# Patient Record
(demographics unavailable — no encounter records)

---

## 2025-06-13 NOTE — PLAN
[FreeTextEntry1] : Reviewed CBE and Pap intervals with patient.  Pap and sti labs collected without complication. pap tolerated well.  Discussed pts fertility. Reviewed when to have sex and how to track ovulation.  Discussed that semen analysis may be necessary for partner.  Referral for hsg and LISA provided today.  Pt expressed understanding.  Questions answered to stated satisfaction.

## 2025-06-13 NOTE — PHYSICAL EXAM
[Chaperoned Physical Exam] : A chaperone was present in the examining room during all aspects of the physical examination. [MA] : MA [Appropriately responsive] : appropriately responsive [Alert] : alert [No Acute Distress] : no acute distress [No Lymphadenopathy] : no lymphadenopathy [Soft] : soft [Non-tender] : non-tender [Non-distended] : non-distended [No Lesions] : no lesions [No Mass] : no mass [Oriented x3] : oriented x3 [Examination Of The Breasts] : a normal appearance [No Masses] : no breast masses were palpable [Labia Majora] : normal [Labia Minora] : normal [Normal] : normal [Anteversion] : anteverted

## 2025-06-13 NOTE — HISTORY OF PRESENT ILLNESS
[Frequency: Q ___ days] : menstrual periods occur approximately every [unfilled] days [Yes] : Patient has concerns regarding sex [Currently Active] : currently active [Men] : men [Patient reported PAP Smear was normal] : Patient reported PAP Smear was normal [HIV Test offered] : HIV Test offered [Syphilis test offered] : Syphilis test offered [Gonorrhea test offered] : Gonorrhea test offered [Chlamydia test offered] : Chlamydia test offered [Trichomonas test offered] : Trichomonas test offered [HPV test offered] : HPV test offered [Hepatitis B test offered] : Hepatitis B test offered [Hepatitis C test offered] : Hepatitis C test offered [N] : Patient does not use contraception [Y] : Patient is sexually active [No] : No [Patient would like to be screened for STIs] : Patient would like to be screened for STIs [TextBox_31] : unsure [LMPDate] : 6/1/2025 [MensesLength] : 3-4 [PGHxABSpont] : 1 [FreeTextEntry1] : 06/1/2025